# Patient Record
Sex: MALE | Race: WHITE | NOT HISPANIC OR LATINO | URBAN - METROPOLITAN AREA
[De-identification: names, ages, dates, MRNs, and addresses within clinical notes are randomized per-mention and may not be internally consistent; named-entity substitution may affect disease eponyms.]

---

## 2017-06-02 ENCOUNTER — HOSPITAL ENCOUNTER (OUTPATIENT)
Dept: CARDIOLOGY | Facility: HOSPITAL | Age: 57
Discharge: HOME | End: 2017-06-02
Attending: INTERNAL MEDICINE | Admitting: INTERNAL MEDICINE

## 2017-12-27 ENCOUNTER — HOSPITAL ENCOUNTER (OUTPATIENT)
Dept: PREADMISSION TESTING | Facility: HOSPITAL | Age: 57
Discharge: HOME | End: 2017-12-27
Attending: ANESTHESIOLOGY | Admitting: ANESTHESIOLOGY

## 2017-12-27 LAB
ANION GAP SERPL CALC-SCNC: 12.2 MMOL/L (ref 10–20)
BUN SERPL-MCNC: 12 MG/DL (ref 8–20)
BUN/CREAT SERPL: 15 (ref 6.2–20.3)
CALCIUM SERPL-MCNC: 9.3 MG/DL (ref 8.9–10.3)
CHLORIDE SERPL-SCNC: 96 MMOL/L (ref 101–111)
CONV CO2: 31 MMOL/L (ref 22–32)
CREAT UR-MCNC: 0.8 MG/DL (ref 0.7–1.2)
GLUCOSE SERPL-MCNC: 93 MG/DL (ref 65–99)
POTASSIUM SERPL-SCNC: 3.2 MMOL/L (ref 3.6–5.1)
SODIUM SERPL-SCNC: 136 MMOL/L (ref 136–144)

## 2017-12-29 ENCOUNTER — HOSPITAL ENCOUNTER (OUTPATIENT)
Dept: OTHER | Facility: HOSPITAL | Age: 57
Setting detail: SPECIMEN
Discharge: HOME | End: 2017-12-29
Attending: UROLOGY | Admitting: UROLOGY

## 2018-04-24 ENCOUNTER — HOSPITAL ENCOUNTER (OUTPATIENT)
Dept: LAB | Facility: HOSPITAL | Age: 58
Discharge: HOME | End: 2018-04-24
Attending: UROLOGY | Admitting: UROLOGY

## 2018-04-24 LAB
ANION GAP SERPL CALC-SCNC: 10.4 MMOL/L (ref 10–20)
BASOPHILS # BLD AUTO: 0 10*3/UL (ref 0–0.2)
BASOPHILS NFR BLD AUTO: 0 % (ref 0–2)
BUN SERPL-MCNC: 11 MG/DL (ref 8–20)
BUN/CREAT SERPL: 12.2 (ref 6.2–20.3)
CALCIUM SERPL-MCNC: 9.2 MG/DL (ref 8.9–10.3)
CHLORIDE SERPL-SCNC: 102 MMOL/L (ref 101–111)
CONV CO2: 29 MMOL/L (ref 22–32)
CREAT UR-MCNC: 0.9 MG/DL (ref 0.7–1.2)
DIFFERENTIAL METHOD BLD: (no result)
EOSINOPHIL # BLD AUTO: 0.1 10*3/UL (ref 0–0.3)
EOSINOPHIL # BLD AUTO: 2 % (ref 0–3)
ERYTHROCYTE [DISTWIDTH] IN BLOOD BY AUTOMATED COUNT: 12.8 % (ref 11.5–14.5)
GLUCOSE SERPL-MCNC: 102 MG/DL (ref 65–99)
HCT VFR BLD AUTO: 37.7 % (ref 40–54)
HGB BLD-MCNC: 12.8 G/DL (ref 14–18)
LYMPHOCYTES # BLD AUTO: 1.7 10*3/UL (ref 0.8–4.8)
LYMPHOCYTES NFR BLD AUTO: 26 % (ref 18–42)
MCH RBC QN AUTO: 30.2 PG (ref 26–32)
MCHC RBC AUTO-ENTMCNC: 33.9 G/DL (ref 32–36)
MCV RBC AUTO: 89.1 FL (ref 80–94)
MONOCYTES # BLD AUTO: 0.8 10*3/UL (ref 0.1–1.3)
MONOCYTES NFR BLD AUTO: 13 % (ref 2–11)
NEUTROPHILS # BLD AUTO: 3.8 10*3/UL (ref 2.3–8.6)
NEUTROPHILS NFR BLD AUTO: 59 % (ref 50–75)
NRBC BLD AUTO-RTO: 0 /100{WBCS}
NRBC/RBC NFR BLD MANUAL: 0 10*3/UL
PLATELET # BLD AUTO: 209 10*3/UL (ref 150–450)
PMV BLD AUTO: 8 FL (ref 7.4–10.4)
POTASSIUM SERPL-SCNC: 3.4 MMOL/L (ref 3.6–5.1)
RBC # BLD AUTO: 4.23 10*6/UL (ref 4.6–6)
SODIUM SERPL-SCNC: 138 MMOL/L (ref 136–144)
WBC # BLD AUTO: 6.5 10*3/UL (ref 4.5–11.5)

## 2018-05-01 ENCOUNTER — HOSPITAL ENCOUNTER (OUTPATIENT)
Dept: OTHER | Facility: HOSPITAL | Age: 58
Setting detail: SPECIMEN
Discharge: HOME | End: 2018-05-01
Attending: UROLOGY | Admitting: UROLOGY

## 2018-09-06 ENCOUNTER — HOSPITAL ENCOUNTER (OUTPATIENT)
Dept: PREADMISSION TESTING | Facility: HOSPITAL | Age: 58
Discharge: HOME | End: 2018-09-06
Attending: UROLOGY | Admitting: UROLOGY

## 2018-09-06 LAB
ANION GAP SERPL CALC-SCNC: 9.1 MMOL/L (ref 10–20)
BASOPHILS # BLD AUTO: 0 10*3/UL (ref 0–0.2)
BASOPHILS NFR BLD AUTO: 1 % (ref 0–2)
BUN SERPL-MCNC: 11 MG/DL (ref 8–20)
BUN/CREAT SERPL: 12.2 (ref 6.2–20.3)
CALCIUM SERPL-MCNC: 9 MG/DL (ref 8.9–10.3)
CHLORIDE SERPL-SCNC: 103 MMOL/L (ref 101–111)
CONV CO2: 30 MMOL/L (ref 22–32)
CREAT UR-MCNC: 0.9 MG/DL (ref 0.7–1.2)
DIFFERENTIAL METHOD BLD: (no result)
EOSINOPHIL # BLD AUTO: 0.1 10*3/UL (ref 0–0.3)
EOSINOPHIL # BLD AUTO: 2 % (ref 0–3)
ERYTHROCYTE [DISTWIDTH] IN BLOOD BY AUTOMATED COUNT: 12.7 % (ref 11.5–14.5)
GLUCOSE SERPL-MCNC: 108 MG/DL (ref 65–99)
HCT VFR BLD AUTO: 40.1 % (ref 40–54)
HGB BLD-MCNC: 13.3 G/DL (ref 14–18)
LYMPHOCYTES # BLD AUTO: 1.4 10*3/UL (ref 0.8–4.8)
LYMPHOCYTES NFR BLD AUTO: 26 % (ref 18–42)
MCH RBC QN AUTO: 29.9 PG (ref 26–32)
MCHC RBC AUTO-ENTMCNC: 33.3 G/DL (ref 32–36)
MCV RBC AUTO: 89.7 FL (ref 80–94)
MONOCYTES # BLD AUTO: 0.6 10*3/UL (ref 0.1–1.3)
MONOCYTES NFR BLD AUTO: 12 % (ref 2–11)
NEUTROPHILS # BLD AUTO: 3.2 10*3/UL (ref 2.3–8.6)
NEUTROPHILS NFR BLD AUTO: 59 % (ref 50–75)
NRBC BLD AUTO-RTO: 0 /100{WBCS}
NRBC/RBC NFR BLD MANUAL: 0 10*3/UL
PLATELET # BLD AUTO: 236 10*3/UL (ref 150–450)
PMV BLD AUTO: 8.1 FL (ref 7.4–10.4)
POTASSIUM SERPL-SCNC: 3.1 MMOL/L (ref 3.6–5.1)
RBC # BLD AUTO: 4.47 10*6/UL (ref 4.6–6)
SODIUM SERPL-SCNC: 139 MMOL/L (ref 136–144)
WBC # BLD AUTO: 5.4 10*3/UL (ref 4.5–11.5)

## 2018-09-11 ENCOUNTER — HOSPITAL ENCOUNTER (OUTPATIENT)
Dept: OTHER | Facility: HOSPITAL | Age: 58
Setting detail: SPECIMEN
Discharge: HOME | End: 2018-09-11
Attending: UROLOGY | Admitting: UROLOGY

## 2019-02-11 ENCOUNTER — ON CAMPUS - OUTPATIENT (AMBULATORY)
Dept: URBAN - METROPOLITAN AREA HOSPITAL 2 | Facility: HOSPITAL | Age: 59
End: 2019-02-11
Payer: COMMERCIAL

## 2019-02-11 VITALS
DIASTOLIC BLOOD PRESSURE: 76 MMHG | HEART RATE: 86 BPM | HEART RATE: 74 BPM | HEART RATE: 65 BPM | SYSTOLIC BLOOD PRESSURE: 130 MMHG | DIASTOLIC BLOOD PRESSURE: 64 MMHG | SYSTOLIC BLOOD PRESSURE: 109 MMHG | SYSTOLIC BLOOD PRESSURE: 148 MMHG | SYSTOLIC BLOOD PRESSURE: 119 MMHG | RESPIRATION RATE: 16 BRPM | HEIGHT: 73 IN | SYSTOLIC BLOOD PRESSURE: 120 MMHG | SYSTOLIC BLOOD PRESSURE: 124 MMHG | SYSTOLIC BLOOD PRESSURE: 105 MMHG | OXYGEN SATURATION: 98 % | HEART RATE: 75 BPM | DIASTOLIC BLOOD PRESSURE: 69 MMHG | OXYGEN SATURATION: 99 % | DIASTOLIC BLOOD PRESSURE: 74 MMHG | OXYGEN SATURATION: 97 % | OXYGEN SATURATION: 95 % | OXYGEN SATURATION: 100 % | HEART RATE: 70 BPM | DIASTOLIC BLOOD PRESSURE: 89 MMHG | DIASTOLIC BLOOD PRESSURE: 66 MMHG | WEIGHT: 195 LBS | TEMPERATURE: 98.2 F | DIASTOLIC BLOOD PRESSURE: 80 MMHG | RESPIRATION RATE: 18 BRPM | SYSTOLIC BLOOD PRESSURE: 125 MMHG | HEART RATE: 71 BPM | SYSTOLIC BLOOD PRESSURE: 140 MMHG | HEART RATE: 76 BPM | HEART RATE: 80 BPM

## 2019-02-11 DIAGNOSIS — K57.30 DIVERTICULOSIS OF LARGE INTESTINE WITHOUT PERFORATION OR ABS: ICD-10-CM

## 2019-02-11 DIAGNOSIS — Z80.0 FAMILY HISTORY OF MALIGNANT NEOPLASM OF DIGESTIVE ORGANS: ICD-10-CM

## 2019-02-11 PROCEDURE — 45378 DIAGNOSTIC COLONOSCOPY: CPT | Mod: 33 | Performed by: INTERNAL MEDICINE

## 2019-07-01 RX ORDER — LISINOPRIL 20 MG/1
TABLET ORAL
Qty: 180 TABLET | Refills: 1 | Status: SHIPPED | OUTPATIENT
Start: 2019-07-01 | End: 2020-01-31

## 2020-01-31 RX ORDER — LISINOPRIL 20 MG/1
TABLET ORAL
Qty: 180 TABLET | Refills: 0 | Status: SHIPPED | OUTPATIENT
Start: 2020-01-31 | End: 2020-05-11

## 2020-05-11 RX ORDER — LISINOPRIL 20 MG/1
TABLET ORAL
Qty: 180 TABLET | Refills: 0 | Status: SHIPPED | OUTPATIENT
Start: 2020-05-11 | End: 2020-08-17

## 2020-08-17 RX ORDER — LISINOPRIL 20 MG/1
TABLET ORAL
Qty: 180 TABLET | Refills: 0 | Status: SHIPPED | OUTPATIENT
Start: 2020-08-17 | End: 2020-11-19

## 2020-11-14 DIAGNOSIS — I10 ESSENTIAL HYPERTENSION: Primary | ICD-10-CM

## 2020-11-19 RX ORDER — LISINOPRIL 20 MG/1
TABLET ORAL
Qty: 180 TABLET | Refills: 0 | Status: SHIPPED | OUTPATIENT
Start: 2020-11-19 | End: 2021-02-15

## 2021-02-14 DIAGNOSIS — I10 ESSENTIAL HYPERTENSION: ICD-10-CM

## 2021-02-15 PROBLEM — R07.9 CHEST PAIN: Status: ACTIVE | Noted: 2017-05-17

## 2021-02-15 PROBLEM — F10.10 ALCOHOL ABUSE: Status: ACTIVE | Noted: 2017-05-17

## 2021-02-15 PROBLEM — I10 HYPERTENSION: Status: ACTIVE | Noted: 2017-05-17

## 2021-02-15 PROBLEM — R06.02 SHORTNESS OF BREATH: Status: ACTIVE | Noted: 2017-05-18

## 2021-02-15 RX ORDER — HYDROCHLOROTHIAZIDE 25 MG/1
TABLET ORAL
COMMUNITY
Start: 2017-05-17

## 2021-02-15 RX ORDER — NEBIVOLOL 20 MG/1
TABLET ORAL
COMMUNITY
Start: 2017-05-17

## 2021-02-15 RX ORDER — LISINOPRIL 20 MG/1
TABLET ORAL EVERY 12 HOURS
COMMUNITY
Start: 2018-01-29 | End: 2021-02-15

## 2021-02-15 RX ORDER — LISINOPRIL 20 MG/1
TABLET ORAL
Qty: 180 TABLET | Refills: 0 | Status: SHIPPED | OUTPATIENT
Start: 2021-02-15

## 2021-02-15 RX ORDER — HYDRALAZINE HYDROCHLORIDE 50 MG/1
TABLET, FILM COATED ORAL
COMMUNITY
Start: 2017-05-17

## 2021-02-15 RX ORDER — TAMSULOSIN HYDROCHLORIDE 0.4 MG/1
CAPSULE ORAL
COMMUNITY
Start: 2017-05-17

## 2021-05-14 DIAGNOSIS — I10 ESSENTIAL HYPERTENSION: ICD-10-CM

## 2021-05-14 RX ORDER — AMLODIPINE BESYLATE 5 MG/1
TABLET ORAL
COMMUNITY
Start: 2021-05-14

## 2021-05-14 RX ORDER — LISINOPRIL 20 MG/1
TABLET ORAL
Qty: 180 TABLET | Refills: 0 | OUTPATIENT
Start: 2021-05-14

## 2023-04-19 ENCOUNTER — HOSPITAL ENCOUNTER (OUTPATIENT)
Facility: HOSPITAL | Age: 63
Setting detail: OBSERVATION
Discharge: HOME | End: 2023-04-20
Attending: EMERGENCY MEDICINE | Admitting: INTERNAL MEDICINE
Payer: COMMERCIAL

## 2023-04-19 ENCOUNTER — APPOINTMENT (OUTPATIENT)
Dept: GENERAL RADIOLOGY | Facility: HOSPITAL | Age: 63
End: 2023-04-19
Payer: COMMERCIAL

## 2023-04-19 DIAGNOSIS — R07.9 CHEST PAIN, UNSPECIFIED TYPE: Primary | ICD-10-CM

## 2023-04-19 DIAGNOSIS — I15.0 RENOVASCULAR HYPERTENSION: ICD-10-CM

## 2023-04-19 PROBLEM — R51.9 HEADACHE: Status: ACTIVE | Noted: 2023-04-19

## 2023-04-19 PROBLEM — E87.6 HYPOKALEMIA: Status: ACTIVE | Noted: 2023-04-19

## 2023-04-19 LAB
ALBUMIN SERPL-MCNC: 4.4 G/DL (ref 3.5–5.2)
ALBUMIN/GLOB SERPL: 1.6 G/DL
ALP SERPL-CCNC: 50 U/L (ref 39–117)
ALT SERPL W P-5'-P-CCNC: 15 U/L (ref 1–41)
ANION GAP SERPL CALCULATED.3IONS-SCNC: 12 MMOL/L (ref 5–15)
AST SERPL-CCNC: 17 U/L (ref 1–40)
BASOPHILS # BLD AUTO: 0 10*3/MM3 (ref 0–0.2)
BASOPHILS NFR BLD AUTO: 0.4 % (ref 0–1.5)
BILIRUB SERPL-MCNC: 0.6 MG/DL (ref 0–1.2)
BUN SERPL-MCNC: 15 MG/DL (ref 8–23)
BUN/CREAT SERPL: 18.1 (ref 7–25)
CALCIUM SPEC-SCNC: 9.8 MG/DL (ref 8.6–10.5)
CHLORIDE SERPL-SCNC: 100 MMOL/L (ref 98–107)
CHOLEST SERPL-MCNC: 199 MG/DL (ref 0–200)
CO2 SERPL-SCNC: 29 MMOL/L (ref 22–29)
CREAT SERPL-MCNC: 0.83 MG/DL (ref 0.76–1.27)
DEPRECATED RDW RBC AUTO: 41.1 FL (ref 37–54)
EGFRCR SERPLBLD CKD-EPI 2021: 98.3 ML/MIN/1.73
EOSINOPHIL # BLD AUTO: 0.2 10*3/MM3 (ref 0–0.4)
EOSINOPHIL NFR BLD AUTO: 2.3 % (ref 0.3–6.2)
ERYTHROCYTE [DISTWIDTH] IN BLOOD BY AUTOMATED COUNT: 13.4 % (ref 12.3–15.4)
GEN 5 2HR TROPONIN T REFLEX: 9 NG/L
GLOBULIN UR ELPH-MCNC: 2.8 GM/DL
GLUCOSE SERPL-MCNC: 107 MG/DL (ref 65–99)
HCT VFR BLD AUTO: 39.6 % (ref 37.5–51)
HDLC SERPL-MCNC: 70 MG/DL (ref 40–60)
HGB BLD-MCNC: 13.3 G/DL (ref 13–17.7)
LDLC SERPL CALC-MCNC: 113 MG/DL (ref 0–100)
LDLC/HDLC SERPL: 1.59 {RATIO}
LYMPHOCYTES # BLD AUTO: 2 10*3/MM3 (ref 0.7–3.1)
LYMPHOCYTES NFR BLD AUTO: 26.6 % (ref 19.6–45.3)
MAGNESIUM SERPL-MCNC: 2 MG/DL (ref 1.6–2.4)
MCH RBC QN AUTO: 30 PG (ref 26.6–33)
MCHC RBC AUTO-ENTMCNC: 33.5 G/DL (ref 31.5–35.7)
MCV RBC AUTO: 89.5 FL (ref 79–97)
MONOCYTES # BLD AUTO: 0.7 10*3/MM3 (ref 0.1–0.9)
MONOCYTES NFR BLD AUTO: 9.9 % (ref 5–12)
NEUTROPHILS NFR BLD AUTO: 4.6 10*3/MM3 (ref 1.7–7)
NEUTROPHILS NFR BLD AUTO: 60.8 % (ref 42.7–76)
NRBC BLD AUTO-RTO: 0 /100 WBC (ref 0–0.2)
PLATELET # BLD AUTO: 224 10*3/MM3 (ref 140–450)
PMV BLD AUTO: 7.5 FL (ref 6–12)
POTASSIUM SERPL-SCNC: 2.9 MMOL/L (ref 3.5–5.2)
PROT SERPL-MCNC: 7.2 G/DL (ref 6–8.5)
RBC # BLD AUTO: 4.43 10*6/MM3 (ref 4.14–5.8)
SODIUM SERPL-SCNC: 141 MMOL/L (ref 136–145)
T4 FREE SERPL-MCNC: 1.33 NG/DL (ref 0.93–1.7)
TRIGL SERPL-MCNC: 88 MG/DL (ref 0–150)
TROPONIN T DELTA: 1 NG/L
TROPONIN T SERPL HS-MCNC: 8 NG/L
TSH SERPL DL<=0.05 MIU/L-ACNC: 2.59 UIU/ML (ref 0.27–4.2)
VLDLC SERPL-MCNC: 16 MG/DL (ref 5–40)
WBC NRBC COR # BLD: 7.5 10*3/MM3 (ref 3.4–10.8)

## 2023-04-19 PROCEDURE — 84484 ASSAY OF TROPONIN QUANT: CPT | Performed by: PHYSICIAN ASSISTANT

## 2023-04-19 PROCEDURE — G0378 HOSPITAL OBSERVATION PER HR: HCPCS

## 2023-04-19 PROCEDURE — 80053 COMPREHEN METABOLIC PANEL: CPT | Performed by: PHYSICIAN ASSISTANT

## 2023-04-19 PROCEDURE — 93005 ELECTROCARDIOGRAM TRACING: CPT | Performed by: PHYSICIAN ASSISTANT

## 2023-04-19 PROCEDURE — 80061 LIPID PANEL: CPT | Performed by: INTERNAL MEDICINE

## 2023-04-19 PROCEDURE — 84443 ASSAY THYROID STIM HORMONE: CPT | Performed by: INTERNAL MEDICINE

## 2023-04-19 PROCEDURE — 71045 X-RAY EXAM CHEST 1 VIEW: CPT

## 2023-04-19 PROCEDURE — 36415 COLL VENOUS BLD VENIPUNCTURE: CPT

## 2023-04-19 PROCEDURE — 99285 EMERGENCY DEPT VISIT HI MDM: CPT

## 2023-04-19 PROCEDURE — 83735 ASSAY OF MAGNESIUM: CPT | Performed by: EMERGENCY MEDICINE

## 2023-04-19 PROCEDURE — 84484 ASSAY OF TROPONIN QUANT: CPT | Performed by: INTERNAL MEDICINE

## 2023-04-19 PROCEDURE — 85025 COMPLETE CBC W/AUTO DIFF WBC: CPT | Performed by: PHYSICIAN ASSISTANT

## 2023-04-19 PROCEDURE — 84439 ASSAY OF FREE THYROXINE: CPT | Performed by: INTERNAL MEDICINE

## 2023-04-19 RX ORDER — TAMSULOSIN HYDROCHLORIDE 0.4 MG/1
0.4 CAPSULE ORAL NIGHTLY
Status: DISCONTINUED | OUTPATIENT
Start: 2023-04-19 | End: 2023-04-20 | Stop reason: HOSPADM

## 2023-04-19 RX ORDER — NITROGLYCERIN 0.4 MG/1
0.4 TABLET SUBLINGUAL
Status: DISCONTINUED | OUTPATIENT
Start: 2023-04-19 | End: 2023-04-20 | Stop reason: HOSPADM

## 2023-04-19 RX ORDER — HYDRALAZINE HYDROCHLORIDE 25 MG/1
50 TABLET, FILM COATED ORAL EVERY 12 HOURS SCHEDULED
Status: DISCONTINUED | OUTPATIENT
Start: 2023-04-19 | End: 2023-04-20 | Stop reason: HOSPADM

## 2023-04-19 RX ORDER — SODIUM CHLORIDE 0.9 % (FLUSH) 0.9 %
3-10 SYRINGE (ML) INJECTION AS NEEDED
Status: DISCONTINUED | OUTPATIENT
Start: 2023-04-19 | End: 2023-04-20 | Stop reason: HOSPADM

## 2023-04-19 RX ORDER — SODIUM CHLORIDE 9 MG/ML
40 INJECTION, SOLUTION INTRAVENOUS AS NEEDED
Status: DISCONTINUED | OUTPATIENT
Start: 2023-04-19 | End: 2023-04-20 | Stop reason: HOSPADM

## 2023-04-19 RX ORDER — AMLODIPINE BESYLATE 5 MG/1
5 TABLET ORAL
Status: DISCONTINUED | OUTPATIENT
Start: 2023-04-20 | End: 2023-04-20 | Stop reason: HOSPADM

## 2023-04-19 RX ORDER — ASPIRIN 325 MG
325 TABLET ORAL ONCE
Status: COMPLETED | OUTPATIENT
Start: 2023-04-19 | End: 2023-04-19

## 2023-04-19 RX ORDER — LISINOPRIL 20 MG/1
20 TABLET ORAL 2 TIMES DAILY
Status: DISCONTINUED | OUTPATIENT
Start: 2023-04-19 | End: 2023-04-20 | Stop reason: HOSPADM

## 2023-04-19 RX ORDER — ONDANSETRON 2 MG/ML
4 INJECTION INTRAMUSCULAR; INTRAVENOUS EVERY 6 HOURS PRN
Status: DISCONTINUED | OUTPATIENT
Start: 2023-04-19 | End: 2023-04-20 | Stop reason: HOSPADM

## 2023-04-19 RX ORDER — SODIUM CHLORIDE 0.9 % (FLUSH) 0.9 %
3 SYRINGE (ML) INJECTION EVERY 12 HOURS SCHEDULED
Status: DISCONTINUED | OUTPATIENT
Start: 2023-04-19 | End: 2023-04-20 | Stop reason: HOSPADM

## 2023-04-19 RX ORDER — ASPIRIN 81 MG/1
324 TABLET, CHEWABLE ORAL ONCE
Status: DISCONTINUED | OUTPATIENT
Start: 2023-04-19 | End: 2023-04-19

## 2023-04-19 RX ORDER — POTASSIUM CHLORIDE 20 MEQ/1
40 TABLET, EXTENDED RELEASE ORAL AS NEEDED
Status: DISCONTINUED | OUTPATIENT
Start: 2023-04-19 | End: 2023-04-20 | Stop reason: HOSPADM

## 2023-04-19 RX ORDER — ACETAMINOPHEN 325 MG/1
650 TABLET ORAL EVERY 4 HOURS PRN
Status: DISCONTINUED | OUTPATIENT
Start: 2023-04-19 | End: 2023-04-20 | Stop reason: HOSPADM

## 2023-04-19 RX ORDER — SODIUM CHLORIDE 0.9 % (FLUSH) 0.9 %
10 SYRINGE (ML) INJECTION AS NEEDED
Status: DISCONTINUED | OUTPATIENT
Start: 2023-04-19 | End: 2023-04-20 | Stop reason: HOSPADM

## 2023-04-19 RX ORDER — POTASSIUM CHLORIDE 1.5 G/1.77G
40 POWDER, FOR SOLUTION ORAL AS NEEDED
Status: DISCONTINUED | OUTPATIENT
Start: 2023-04-19 | End: 2023-04-20 | Stop reason: HOSPADM

## 2023-04-19 RX ORDER — FAMOTIDINE 20 MG/1
40 TABLET, FILM COATED ORAL NIGHTLY
Status: DISCONTINUED | OUTPATIENT
Start: 2023-04-19 | End: 2023-04-20 | Stop reason: HOSPADM

## 2023-04-19 RX ORDER — POTASSIUM CHLORIDE 7.45 MG/ML
10 INJECTION INTRAVENOUS
Status: DISCONTINUED | OUTPATIENT
Start: 2023-04-19 | End: 2023-04-20 | Stop reason: HOSPADM

## 2023-04-19 RX ADMIN — HYDRALAZINE HYDROCHLORIDE 50 MG: 25 TABLET, FILM COATED ORAL at 21:35

## 2023-04-19 RX ADMIN — FAMOTIDINE 40 MG: 20 TABLET, FILM COATED ORAL at 21:35

## 2023-04-19 RX ADMIN — ASPIRIN 325 MG ORAL TABLET 325 MG: 325 PILL ORAL at 20:33

## 2023-04-19 RX ADMIN — LISINOPRIL 20 MG: 20 TABLET ORAL at 21:35

## 2023-04-19 RX ADMIN — Medication 3 ML: at 21:37

## 2023-04-19 NOTE — ED PROVIDER NOTES
Subjective    Provider in Triage Note  Patient is a 62-year-old male presents to the ED with complaints of hypertension for the past 2 days.  Patient states his blood pressure is running in the 170s systolically.  Patient states he is on multiple medications for his blood pressure without any changes.  He does report an associated headache reports gradual in onset denies thunderclap or worsening of his life.  No visual disturbances.  He also reports intermittent substernal pressure type chest pain over the past few days none currently.  No reported shortness of breath.  No recent travel surgeries or immobilization.    PCP Dr. Garrido      History of Present Illness  Chief complaint tightness in chest elevated blood pressure    History of present illness a 62-year-old male states that he has had some intermittent tightness in his chest throughout the day.  No neck arm jaw pain has been mild.  No really pain.  He states he has been nauseous throughout the day and has bothered more than anything no vomiting no black or bloody stool.  He denies any fever chills sweats cough congestion.  Blood pressures been elevated in the 170s to 180s.  Nothing really seem to trigger it nothing makes it better nothing makes it worse spent pretty much most of the day at work.  Patient has severe blood pressure he states that he takes about 4 different medications but no change most recently.  No recent long car ride plane ride immobilization foreign travels no leg pain or swelling.  Denies any abdominal pain.  He did also complain of a little bit of a mild headache to the back of his head which is count of been throughout the day is gradual its not a thunderclap there is no associated visual changes vomiting or speech difficulty no paralysis.  Has been able to eat drink talk walk and function normally otherwise        Review of Systems   Constitutional: Negative for chills and fever.   Respiratory: Positive for chest tightness. Negative  for shortness of breath.    Cardiovascular: Negative for chest pain and palpitations.   Gastrointestinal: Positive for nausea. Negative for abdominal pain.   Endocrine: Negative for cold intolerance and heat intolerance.   Genitourinary: Negative for difficulty urinating and dysuria.   Musculoskeletal: Negative for back pain and neck pain.   Skin: Negative for rash and wound.   Neurological: Negative for dizziness and light-headedness.   Psychiatric/Behavioral: Negative for agitation and confusion.       No past medical history on file.  Hypertension  No Known Allergies    No past surgical history on file.    No family history on file.    Social History     Socioeconomic History   • Marital status:      Social history patient has no history tobacco alcohol or drug  Prior to Admission medications    Medication Sig Start Date End Date Taking? Authorizing Provider   amLODIPine (NORVASC) 5 MG tablet  5/14/21   Maciel Isaac MD   hydrALAZINE (APRESOLINE) 50 MG tablet HYDRALAZINE HCL 50 MG TABS 5/17/17   Maciel Isaac MD   hydroCHLOROthiazide (HYDRODIURIL) 25 MG tablet HYDROCHLOROTHIAZIDE 25 MG TABS 5/17/17   Maciel Isaac MD   lisinopril (PRINIVIL,ZESTRIL) 20 MG tablet TAKE 1 TABLET BY MOUTH TWO TIMES A DAY  2/15/21   Gary Miller MD   nebivolol (Bystolic) 20 MG tablet BYSTOLIC 20 MG TABS 5/17/17   Maciel Isaac MD   tamsulosin (Flomax) 0.4 MG capsule 24 hr capsule FLOMAX 0.4 MG CAPS 5/17/17   Maciel Isaac MD         Objective   Physical Exam  Constitutional is a 62-year-old male awake alert no acute distress triage vital signs of been reviewed.  Blood pressure 168/98 temperature is 97.9 heart rate 83 respirations 16 sats 98% on room air HEENT extraocular muscles are intact pupils equal round reactive sclera clear there is no photophobia.  No papilledema.  Neck supple no adenopathy no JV no bruits.  Lungs clear no retraction no use of accessories.  Heart regular without  murmur.  Abdomen soft nontender good bowel sounds no peritoneal findings or pulsatile masses extremities pulses equal upper lower extremities no edema cords or Homans' sign or evidence of DVT.  Skin warm and dry without rashes or cellulitic changes neurologic awake alert orientated x4 no facial asymmetry normal speech no drift the arms legs without focal weakness.  Procedures           ED Course    EKG my interpretation normal sinus rhythm rate of 72 some LVH QTc of 509 no acute ST elevation nonspecific interventricular conduction delay really no significant change from when compared to 12/27/2017 abnormal EKG        Results for orders placed or performed during the hospital encounter of 04/19/23   Comprehensive Metabolic Panel    Specimen: Blood   Result Value Ref Range    Glucose 107 (H) 65 - 99 mg/dL    BUN 15 8 - 23 mg/dL    Creatinine 0.83 0.76 - 1.27 mg/dL    Sodium 141 136 - 145 mmol/L    Potassium 2.9 (L) 3.5 - 5.2 mmol/L    Chloride 100 98 - 107 mmol/L    CO2 29.0 22.0 - 29.0 mmol/L    Calcium 9.8 8.6 - 10.5 mg/dL    Total Protein 7.2 6.0 - 8.5 g/dL    Albumin 4.4 3.5 - 5.2 g/dL    ALT (SGPT) 15 1 - 41 U/L    AST (SGOT) 17 1 - 40 U/L    Alkaline Phosphatase 50 39 - 117 U/L    Total Bilirubin 0.6 0.0 - 1.2 mg/dL    Globulin 2.8 gm/dL    A/G Ratio 1.6 g/dL    BUN/Creatinine Ratio 18.1 7.0 - 25.0    Anion Gap 12.0 5.0 - 15.0 mmol/L    eGFR 98.3 >60.0 mL/min/1.73   High Sensitivity Troponin T    Specimen: Blood   Result Value Ref Range    HS Troponin T 8 <15 ng/L   CBC Auto Differential    Specimen: Blood   Result Value Ref Range    WBC 7.50 3.40 - 10.80 10*3/mm3    RBC 4.43 4.14 - 5.80 10*6/mm3    Hemoglobin 13.3 13.0 - 17.7 g/dL    Hematocrit 39.6 37.5 - 51.0 %    MCV 89.5 79.0 - 97.0 fL    MCH 30.0 26.6 - 33.0 pg    MCHC 33.5 31.5 - 35.7 g/dL    RDW 13.4 12.3 - 15.4 %    RDW-SD 41.1 37.0 - 54.0 fl    MPV 7.5 6.0 - 12.0 fL    Platelets 224 140 - 450 10*3/mm3    Neutrophil % 60.8 42.7 - 76.0 %    Lymphocyte %  26.6 19.6 - 45.3 %    Monocyte % 9.9 5.0 - 12.0 %    Eosinophil % 2.3 0.3 - 6.2 %    Basophil % 0.4 0.0 - 1.5 %    Neutrophils, Absolute 4.60 1.70 - 7.00 10*3/mm3    Lymphocytes, Absolute 2.00 0.70 - 3.10 10*3/mm3    Monocytes, Absolute 0.70 0.10 - 0.90 10*3/mm3    Eosinophils, Absolute 0.20 0.00 - 0.40 10*3/mm3    Basophils, Absolute 0.00 0.00 - 0.20 10*3/mm3    nRBC 0.0 0.0 - 0.2 /100 WBC   High Sensitivity Troponin T 2Hr    Specimen: Blood   Result Value Ref Range    HS Troponin T 9 <15 ng/L    Troponin T Delta 1 >=-4 - <+4 ng/L   Magnesium    Specimen: Blood   Result Value Ref Range    Magnesium 2.0 1.6 - 2.4 mg/dL   Lipid Panel    Specimen: Blood   Result Value Ref Range    Total Cholesterol 199 0 - 200 mg/dL    Triglycerides 88 0 - 150 mg/dL    HDL Cholesterol 70 (H) 40 - 60 mg/dL    LDL Cholesterol  113 (H) 0 - 100 mg/dL    VLDL Cholesterol 16 5 - 40 mg/dL    LDL/HDL Ratio 1.59    TSH    Specimen: Blood   Result Value Ref Range    TSH 2.590 0.270 - 4.200 uIU/mL   T4, Free    Specimen: Blood   Result Value Ref Range    Free T4 1.33 0.93 - 1.70 ng/dL   ECG 12 Lead Chest Pain   Result Value Ref Range    QT Interval 465 ms     XR Chest 1 View    Result Date: 4/19/2023  Impression: 1.An acute pulmonary process is not apparent. Electronically Signed: Chucho James MD  4/19/2023 9:01 PM EDT  Workstation ID: [id number]    Medications   sodium chloride 0.9 % flush 10 mL (has no administration in time range)   amLODIPine (NORVASC) tablet 5 mg (has no administration in time range)   hydrALAZINE (APRESOLINE) tablet 50 mg (50 mg Oral Given 4/19/23 2135)   lisinopril (PRINIVIL,ZESTRIL) tablet 20 mg (20 mg Oral Given 4/19/23 2135)   tamsulosin (FLOMAX) 24 hr capsule 0.4 mg (0.4 mg Oral Not Given 4/19/23 2135)   sodium chloride 0.9 % flush 3 mL (3 mL Intravenous Given 4/19/23 2137)   sodium chloride 0.9 % flush 3-10 mL (has no administration in time range)   sodium chloride 0.9 % infusion 40 mL (has no administration in time  range)   nitroglycerin (NITROSTAT) SL tablet 0.4 mg (has no administration in time range)   acetaminophen (TYLENOL) tablet 650 mg (has no administration in time range)   ondansetron (ZOFRAN) injection 4 mg (has no administration in time range)   famotidine (PEPCID) tablet 40 mg (40 mg Oral Given 4/19/23 2135)   potassium chloride (K-DUR,KLOR-CON) CR tablet 40 mEq (has no administration in time range)     Or   potassium chloride (KLOR-CON) packet 40 mEq (has no administration in time range)     Or   potassium chloride 10 mEq in 100 mL IVPB (has no administration in time range)   aspirin tablet 325 mg (325 mg Oral Given 4/19/23 2033)                                       Medical Decision Making  Medical decision making IV established monitor placed my review of sinus rhythm.  EKG obtained reviewed by me independently shows a sinus rhythm with some LVH and nonspecific interventricular duction delay but there is no change from comparison 12/27/2017.  Abnormal EKG.  Labs obtained independent reviewed by me.  Chemistries unremarkable and a potassium of 2.9 troponin was negative CBC unremarkable repeat troponin was negative.  Chest x-ray obtained independent reviewed by me no pneumonia pneumothorax or acute findings radiology unremarkable.  Patient repeat exam is resting comfortably.  Was given an aspirin p.o.  Did not feel the need to do a CAT scan of the patient's head he has a mild headache his blood pressure was elevated he has no findings here he states he feels otherwise fine there is no evidence of acute stroke or acute hemorrhage on my exam.  So did not feel that was necessary at this point.  I do not see evidence of acute myocardial infarction DVT pulmonary embolism or aortic dissection based on the history physical clinical findings.  This is not a complete list of all possibilities that can cause chest pains and headaches elevated blood pressure.  Patient had been given aspirin p.o. I talked to Dr. Fierro the  patient's primary care providers and discussed the case and he will be admitted for further work-up stable unremarkable ER course.    Chest pain, unspecified type: acute illness or injury  Amount and/or Complexity of Data Reviewed  Labs: ordered. Decision-making details documented in ED Course.  Radiology: ordered and independent interpretation performed.  ECG/medicine tests: ordered and independent interpretation performed.      Risk  Decision regarding hospitalization.          Final diagnoses:   Chest pain, unspecified type       ED Disposition  ED Disposition     ED Disposition   Decision to Admit    Condition   --    Comment   Level of Care: Telemetry [5]   Admitting Physician: GERALDINE GREY [[id number]]   Attending Physician: GERALDINE GREY [[id number]]               No follow-up provider specified.       Medication List      No changes were made to your prescriptions during this visit.          Syed Armas MD  04/19/23 0593

## 2023-04-20 ENCOUNTER — APPOINTMENT (OUTPATIENT)
Dept: NUCLEAR MEDICINE | Facility: HOSPITAL | Age: 63
End: 2023-04-20
Payer: COMMERCIAL

## 2023-04-20 ENCOUNTER — APPOINTMENT (OUTPATIENT)
Dept: CARDIOLOGY | Facility: HOSPITAL | Age: 63
End: 2023-04-20
Payer: COMMERCIAL

## 2023-04-20 VITALS
SYSTOLIC BLOOD PRESSURE: 148 MMHG | TEMPERATURE: 97.9 F | RESPIRATION RATE: 16 BRPM | BODY MASS INDEX: 25.62 KG/M2 | HEART RATE: 72 BPM | OXYGEN SATURATION: 95 % | DIASTOLIC BLOOD PRESSURE: 83 MMHG | HEIGHT: 71 IN | WEIGHT: 182.98 LBS

## 2023-04-20 LAB
BH CV NUCLEAR PRIOR STUDY: 2
BH CV REST NUCLEAR ISOTOPE DOSE: 11 MCI
BH CV STRESS BP STAGE 1: NORMAL
BH CV STRESS BP STAGE 2: NORMAL
BH CV STRESS BP STAGE 3: NORMAL
BH CV STRESS BP STAGE 4: NORMAL
BH CV STRESS BP STAGE 5: NORMAL
BH CV STRESS DURATION MIN STAGE 1: 3
BH CV STRESS DURATION MIN STAGE 2: 3
BH CV STRESS DURATION MIN STAGE 3: 3
BH CV STRESS DURATION MIN STAGE 4: 1
BH CV STRESS DURATION MIN STAGE 5: 1
BH CV STRESS DURATION SEC STAGE 1: 0
BH CV STRESS DURATION SEC STAGE 2: 0
BH CV STRESS DURATION SEC STAGE 3: 0
BH CV STRESS DURATION SEC STAGE 4: 33
BH CV STRESS DURATION SEC STAGE 5: 11
BH CV STRESS GRADE STAGE 1: 10
BH CV STRESS GRADE STAGE 2: 12
BH CV STRESS GRADE STAGE 3: 14
BH CV STRESS GRADE STAGE 4: 16
BH CV STRESS GRADE STAGE 5: 18
BH CV STRESS HR STAGE 1: 99
BH CV STRESS HR STAGE 2: 109
BH CV STRESS HR STAGE 3: 132
BH CV STRESS HR STAGE 4: 146
BH CV STRESS HR STAGE 5: 162
BH CV STRESS METS STAGE 1: 5
BH CV STRESS METS STAGE 2: 7.5
BH CV STRESS METS STAGE 3: 10
BH CV STRESS METS STAGE 4: 13.5
BH CV STRESS METS STAGE 5: 15
BH CV STRESS NUCLEAR ISOTOPE DOSE: 31.1 MCI
BH CV STRESS PROTOCOL 1: NORMAL
BH CV STRESS RECOVERY BP: NORMAL MMHG
BH CV STRESS RECOVERY HR: 102 BPM
BH CV STRESS SPEED STAGE 1: 1.7
BH CV STRESS SPEED STAGE 2: 2.5
BH CV STRESS SPEED STAGE 3: 3.4
BH CV STRESS SPEED STAGE 4: 4.2
BH CV STRESS SPEED STAGE 5: 5
BH CV STRESS STAGE 1: 1
BH CV STRESS STAGE 2: 2
BH CV STRESS STAGE 3: 3
BH CV STRESS STAGE 4: 4
BH CV STRESS STAGE 5: 5
LV EF NUC BP: 62 %
MAXIMAL PREDICTED HEART RATE: 157 BPM
PERCENT MAX PREDICTED HR: 103.82 %
POTASSIUM SERPL-SCNC: 3.5 MMOL/L (ref 3.5–5.2)
QT INTERVAL: 465 MS
STRESS BASELINE BP: NORMAL MMHG
STRESS BASELINE HR: 84 BPM
STRESS PERCENT HR: 122 %
STRESS POST ESTIMATED WORKLOAD: 14.8 METS
STRESS POST EXERCISE DUR MIN: 11 MIN
STRESS POST EXERCISE DUR SEC: 44 SEC
STRESS POST PEAK BP: NORMAL MMHG
STRESS POST PEAK HR: 163 BPM
STRESS TARGET HR: 133 BPM

## 2023-04-20 PROCEDURE — 78452 HT MUSCLE IMAGE SPECT MULT: CPT | Performed by: INTERNAL MEDICINE

## 2023-04-20 PROCEDURE — 78452 HT MUSCLE IMAGE SPECT MULT: CPT

## 2023-04-20 PROCEDURE — 93017 CV STRESS TEST TRACING ONLY: CPT

## 2023-04-20 PROCEDURE — G0378 HOSPITAL OBSERVATION PER HR: HCPCS

## 2023-04-20 PROCEDURE — 84132 ASSAY OF SERUM POTASSIUM: CPT | Performed by: INTERNAL MEDICINE

## 2023-04-20 PROCEDURE — A9502 TC99M TETROFOSMIN: HCPCS | Performed by: INTERNAL MEDICINE

## 2023-04-20 PROCEDURE — 93018 CV STRESS TEST I&R ONLY: CPT | Performed by: INTERNAL MEDICINE

## 2023-04-20 PROCEDURE — 0 TECHNETIUM TETROFOSMIN KIT: Performed by: INTERNAL MEDICINE

## 2023-04-20 RX ORDER — ACETAMINOPHEN 325 MG/1
650 TABLET ORAL EVERY 4 HOURS PRN
Start: 2023-04-20

## 2023-04-20 RX ORDER — FAMOTIDINE 40 MG/1
40 TABLET, FILM COATED ORAL NIGHTLY
Qty: 30 TABLET | Refills: 1 | Status: SHIPPED | OUTPATIENT
Start: 2023-04-20

## 2023-04-20 RX ADMIN — TETROFOSMIN 1 DOSE: 1.38 INJECTION, POWDER, LYOPHILIZED, FOR SOLUTION INTRAVENOUS at 10:37

## 2023-04-20 RX ADMIN — POTASSIUM CHLORIDE 40 MEQ: 1500 TABLET, EXTENDED RELEASE ORAL at 12:27

## 2023-04-20 RX ADMIN — AMLODIPINE BESYLATE 5 MG: 5 TABLET ORAL at 08:12

## 2023-04-20 RX ADMIN — HYDRALAZINE HYDROCHLORIDE 50 MG: 25 TABLET, FILM COATED ORAL at 08:12

## 2023-04-20 RX ADMIN — POTASSIUM CHLORIDE 40 MEQ: 1500 TABLET, EXTENDED RELEASE ORAL at 06:52

## 2023-04-20 RX ADMIN — LISINOPRIL 20 MG: 20 TABLET ORAL at 08:12

## 2023-04-20 RX ADMIN — TETROFOSMIN 1 DOSE: 1.38 INJECTION, POWDER, LYOPHILIZED, FOR SOLUTION INTRAVENOUS at 12:04

## 2023-04-20 RX ADMIN — Medication 3 ML: at 08:13

## 2023-04-20 RX ADMIN — POTASSIUM CHLORIDE 40 MEQ: 1500 TABLET, EXTENDED RELEASE ORAL at 02:32

## 2023-04-20 NOTE — H&P
Patient Care Team:  Syed Garrido MD as PCP - General    Chief complaint chest pain, hypertension    Subjective     Patient is a 62 y.o. male who presents with complaints of hypertension for the past two days., running in the 170's systolic. He also has had intermittent substernal chest pressure over the past few days. Complains of mild headache. Denies any fever, chills, nausea, vomiting, visual changes, shortness of breath. Denies any chest pain at this time.     ER course with troponin negative x2, EKG sinus rhythm rate 72, Potassium 2.9. Blood pressure maintained 150-160's systolic.        Onset of symptoms was  2 days    Review of Systems   Constitutional: Negative.    Eyes: Negative.    Respiratory: Positive for chest tightness.    Cardiovascular: Positive for chest pain.   Gastrointestinal: Negative.    Endocrine: Negative.    Genitourinary: Negative.    Musculoskeletal: Negative.    Skin: Negative.    Neurological: Positive for headaches.   Psychiatric/Behavioral: Negative.           History  History reviewed. No pertinent past medical history.  No past surgical history on file.  History reviewed. No pertinent family history.     (Not in a hospital admission)    Allergies:  Patient has no known allergies.    Objective     Vital Signs  Temp:  [97.9 °F (36.6 °C)] 97.9 °F (36.6 °C)  Heart Rate:  [70-83] 71  Resp:  [16] 16  BP: (157-179)/(80-98) 159/80     Physical Exam:      General Appearance:    Alert, cooperative, in no acute distress   Head:    Normocephalic, without obvious abnormality, atraumatic   Eyes:            Lids and lashes normal, conjunctivae and sclerae normal, no   icterus, no pallor, corneas clear, PERRLA   Ears:    Ears appear intact with no abnormalities noted   Throat:   No oral lesions, no thrush, oral mucosa moist   Neck:   No adenopathy, supple, trachea midline, no thyromegaly, no   carotid bruit, no JVD   Lungs:     Clear to auscultation,respirations regular, even and                   unlabored    Heart:    Regular rhythm and normal rate, normal S1 and S2, no            murmur, no gallop, no rub, no click   Chest Wall:    No abnormalities observed   Abdomen:     Normal bowel sounds, no masses, no organomegaly, soft        non-tender, non-distended, no guarding, no rebound                tenderness   Extremities:   Moves all extremities well, no edema, no cyanosis, no             redness   Pulses:   Pulses palpable and equal bilaterally   Skin:   No bleeding, bruising or rash   Lymph nodes:   No palpable adenopathy   Neurologic:   No focal deficits noted       Results Review:     Imaging Results (Last 24 Hours)     Procedure Component Value Units Date/Time    XR Chest 1 View [[id number]] Collected: 04/19/23 2100     Updated: 04/19/23 2104    Narrative:      XR CHEST 1 VW    Date of Exam: 4/19/2023 8:20 PM EDT    Indication: Chest Pain Protocol  Chest Pain Protocol.    Comparison: None available.    Findings:  The heart is not definitely enlarged. The lungs seem clear. There are no pleural effusions.      Impression:      Impression:  1.An acute pulmonary process is not apparent.      Electronically Signed: Chucho James MD    4/19/2023 9:01 PM EDT    Workstation ID: [id number]           Lab Results (last 24 hours)     Procedure Component Value Units Date/Time    High Sensitivity Troponin T 2Hr [[id number]]  (Normal) Collected: 04/19/23 2158    Specimen: Blood Updated: 04/19/23 2223     HS Troponin T 9 ng/L      Troponin T Delta 1 ng/L     Narrative:      High Sensitive Troponin T Reference Range:  <10.0 ng/L- Negative Female for AMI  <15.0 ng/L- Negative Male for AMI  >=10 - Abnormal Female indicating possible myocardial injury.  >=15 - Abnormal Male indicating possible myocardial injury.   Clinicians would have to utilize clinical acumen, EKG, Troponin, and serial changes to determine if it is an Acute Myocardial Infarction or myocardial injury due to an underlying chronic condition.         T4,  Free [[id number]]  (Normal) Collected: 04/19/23 1935    Specimen: Blood Updated: 04/19/23 2200     Free T4 1.33 ng/dL     Narrative:      Results may be falsely increased if patient taking Biotin.      Lipid Panel [[id number]]  (Abnormal) Collected: 04/19/23 1935    Specimen: Blood Updated: 04/19/23 2154     Total Cholesterol 199 mg/dL      Triglycerides 88 mg/dL      HDL Cholesterol 70 mg/dL      LDL Cholesterol  113 mg/dL      VLDL Cholesterol 16 mg/dL      LDL/HDL Ratio 1.59    Narrative:      Cholesterol Reference Ranges  (U.[place] Department of Health and Human Services ATP III Classifications)    Desirable          <200 mg/dL  Borderline High    200-239 mg/dL  High Risk          >240 mg/dL      Triglyceride Reference Ranges  (U.S. Department of Health and Human Services ATP III Classifications)    Normal           <150 mg/dL  Borderline High  150-199 mg/dL  High             200-499 mg/dL  Very High        >500 mg/dL    HDL Reference Ranges  (U.S. Department of Health and Human Services ATP III Classifications)    Low     <40 mg/dl (major risk factor for CHD)  High    >60 mg/dl ('negative' risk factor for CHD)        LDL Reference Ranges  (U.S. Department of Health and Human Services ATP III Classifications)    Optimal          <100 mg/dL  Near Optimal     100-129 mg/dL  Borderline High  130-159 mg/dL  High             160-189 mg/dL  Very High        >189 mg/dL    TSH [[id number]]  (Normal) Collected: 04/19/23 1935    Specimen: Blood Updated: 04/19/23 2108     TSH 2.590 uIU/mL     Magnesium [[id number]]  (Normal) Collected: 04/19/23 1935    Specimen: Blood Updated: 04/19/23 2036     Magnesium 2.0 mg/dL     Comprehensive Metabolic Panel [[id number]]  (Abnormal) Collected: 04/19/23 1935    Specimen: Blood Updated: 04/19/23 2009     Glucose 107 mg/dL      BUN 15 mg/dL      Creatinine 0.83 mg/dL      Sodium 141 mmol/L      Potassium 2.9 mmol/L      Chloride 100 mmol/L      CO2 29.0 mmol/L      Calcium 9.8 mg/dL      Total  Protein 7.2 g/dL      Albumin 4.4 g/dL      ALT (SGPT) 15 U/L      AST (SGOT) 17 U/L      Alkaline Phosphatase 50 U/L      Total Bilirubin 0.6 mg/dL      Globulin 2.8 gm/dL      A/G Ratio 1.6 g/dL      BUN/Creatinine Ratio 18.1     Anion Gap 12.0 mmol/L      eGFR 98.3 mL/min/1.73     Narrative:      GFR Normal >60  Chronic Kidney Disease <60  Kidney Failure <15      High Sensitivity Troponin T [[id number]]  (Normal) Collected: 04/19/23 1935    Specimen: Blood Updated: 04/19/23 2009     HS Troponin T 8 ng/L     Narrative:      High Sensitive Troponin T Reference Range:  <10.0 ng/L- Negative Female for AMI  <15.0 ng/L- Negative Male for AMI  >=10 - Abnormal Female indicating possible myocardial injury.  >=15 - Abnormal Male indicating possible myocardial injury.   Clinicians would have to utilize clinical acumen, EKG, Troponin, and serial changes to determine if it is an Acute Myocardial Infarction or myocardial injury due to an underlying chronic condition.         CBC & Differential [[id number]]  (Normal) Collected: 04/19/23 1935    Specimen: Blood Updated: 04/19/23 1951    Narrative:      The following orders were created for panel order CBC & Differential.  Procedure                               Abnormality         Status                     ---------                               -----------         ------                     CBC Auto Differential[[id number]]        Normal              Final result                 Please view results for these tests on the individual orders.    CBC Auto Differential [[id number]]  (Normal) Collected: 04/19/23 1935    Specimen: Blood Updated: 04/19/23 1951     WBC 7.50 10*3/mm3      RBC 4.43 10*6/mm3      Hemoglobin 13.3 g/dL      Hematocrit 39.6 %      MCV 89.5 fL      MCH 30.0 pg      MCHC 33.5 g/dL      RDW 13.4 %      RDW-SD 41.1 fl      MPV 7.5 fL      Platelets 224 10*3/mm3      Neutrophil % 60.8 %      Lymphocyte % 26.6 %      Monocyte % 9.9 %      Eosinophil % 2.3 %       Basophil % 0.4 %      Neutrophils, Absolute 4.60 10*3/mm3      Lymphocytes, Absolute 2.00 10*3/mm3      Monocytes, Absolute 0.70 10*3/mm3      Eosinophils, Absolute 0.20 10*3/mm3      Basophils, Absolute 0.00 10*3/mm3      nRBC 0.0 /100 WBC            I reviewed the patient's new clinical results.    Assessment & Plan       Chest pain  -troponin negative x2  -EKG sinus rhythm  -CXR with no acute findings  -Echo pending  -Keep npo for stress test in AM    Hypokalemia  -K 2.9, replaced      DVT prophylaxis- SCD's  GI prophylaxis- pepcid    I discussed the patient's findings and my recommendations with patient.     Eli Steele, APRN  04/19/23  22:55 EDT

## 2023-04-20 NOTE — PLAN OF CARE
Goal Outcome Evaluation:      Pt was admitted to the unit. Pt was oriented to the the unit. Education provided on the use of call light, and other safety measures. V/S checked as documented. Safety measures in place. Call light within reach. Will continue to monitor.

## 2023-04-20 NOTE — PLAN OF CARE
Problem: Adult Inpatient Plan of Care  Goal: Absence of Hospital-Acquired Illness or Injury  Intervention: Identify and Manage Fall Risk  Recent Flowsheet Documentation  Taken 4/20/2023 0153 by Cruz Wood RN  Safety Promotion/Fall Prevention:   assistive device/personal items within reach   clutter free environment maintained   fall prevention program maintained   nonskid shoes/slippers when out of bed   room organization consistent   safety round/check completed  Intervention: Prevent Skin Injury  Recent Flowsheet Documentation  Taken 4/20/2023 0153 by Cruz Wood RN  Body Position: position changed independently  Skin Protection: adhesive use limited  Intervention: Prevent and Manage VTE (Venous Thromboembolism) Risk  Recent Flowsheet Documentation  Taken 4/20/2023 0153 by Cruz Wood RN  VTE Prevention/Management:   sequential compression devices off   patient refused intervention  Range of Motion:   active ROM (range of motion) encouraged   ROM (range of motion) performed  Intervention: Prevent Infection  Recent Flowsheet Documentation  Taken 4/20/2023 0153 by Cruz Wood RN  Infection Prevention:   single patient room provided   rest/sleep promoted   personal protective equipment utilized   hand hygiene promoted   equipment surfaces disinfected   environmental surveillance performed  Goal: Optimal Comfort and Wellbeing  Intervention: Monitor Pain and Promote Comfort  Recent Flowsheet Documentation  Taken 4/20/2023 0153 by Cruz Wood RN  Pain Management Interventions: (pt declined anything for headache right now)   see MAR   quiet environment facilitated  Intervention: Provide Person-Centered Care  Recent Flowsheet Documentation  Taken 4/20/2023 0153 by Cruz Wood RN  Trust Relationship/Rapport: care explained  Goal: Readiness for Transition of Care  Intervention: Mutually Develop Transition Plan  Recent Flowsheet Documentation  Taken 4/20/2023 0143 by Cruz Wood  RN  Equipment Currently Used at Home: none  Taken 4/20/2023 0141 by Cruz Wood RN  Transportation Anticipated: family or friend will provide  Patient/Family Anticipated Services at Transition: none  Patient/Family Anticipates Transition to: home with family  Taken 4/20/2023 0140 by Cruz Wood, RN  Transportation Anticipated: family or friend will provide  Patient/Family Anticipated Services at Transition: none  Patient/Family Anticipates Transition to: home with family  Taken 4/20/2023 0128 by Cruz Wood, RN  Equipment Currently Used at Home: none   Goal Outcome Evaluation:

## 2023-04-20 NOTE — CASE MANAGEMENT/SOCIAL WORK
Continued Stay Note  MARCY Chong     Patient Name: Ivan Smith  MRN: [id number]  Today's Date: 4/20/2023    Admit Date: 4/19/2023    Plan: Needs screened   Discharge Plan     Row Name 04/20/23 1227       Plan    Plan Needs screened    Plan Comments Pt off floor for testing during CM rounds            Phone communication or documentation only - no physical contact with patient or family.             Rodriguez Funk RN

## 2023-04-20 NOTE — DISCHARGE SUMMARY
Date of Discharge:  4/20/2023    Discharge Diagnosis:   **Chest pain [R07.9]   Headache [R51.9]   Hypokalemia [E87.6]   Hypertension [I10]       Presenting Problem/History of Present Illness  Active Hospital Problems    Diagnosis  POA   • **Chest pain [R07.9]  Yes   • Headache [R51.9]  Yes   • Hypokalemia [E87.6]  Yes   • Hypertension [I10]  Yes      Resolved Hospital Problems   No resolved problems to display.          Hospital Course  Patient is a 62 y.o. male with history of hypertension who presented with episode of intermittent substernal chest pressure and elevated blood pressure readings.  His normal systolic blood pressure is in the 150s.  He noticed that his blood pressure was running in the 170s at home.  He did not feel well and had some vague substernal chest pressure.  He had no diaphoresis, nausea, dyspnea.  He has been compliant with his home blood pressure regimen.  There was no prior history of coronary artery disease.  He ruled out for acute coronary syndrome with unremarkable EKG and normal serial troponins.  His potassium was slightly low on arrival but was replaced.  He had no recurrence of his symptoms.  Blood pressure was well controlled with home medications.  Stress test was normal.  The time of discharge she is hemodynamically stable and has a normal exam.  I have added famotidine for possible GERD symptoms causing chest pain.  He will also be scheduled for an outpatient renal Doppler to rule out renal artery stenosis as a cause of hypertension that is requiring multiple medications to control.  He has outpatient follow-up scheduled with Dr. Garrido.    Procedures Performed         Consults:   Consults     Date and Time Order Name Status Description    4/19/2023  8:22 PM Family Medicine Consult            Pertinent Test Results:    Lab Results (most recent)     Procedure Component Value Units Date/Time    Potassium [[id number]]  (Normal) Collected: 04/20/23 1554    Specimen: Blood Updated:  04/20/23 1640     Potassium 3.5 mmol/L     High Sensitivity Troponin T 2Hr [[id number]]  (Normal) Collected: 04/19/23 2158    Specimen: Blood Updated: 04/19/23 2223     HS Troponin T 9 ng/L      Troponin T Delta 1 ng/L     Narrative:      High Sensitive Troponin T Reference Range:  <10.0 ng/L- Negative Female for AMI  <15.0 ng/L- Negative Male for AMI  >=10 - Abnormal Female indicating possible myocardial injury.  >=15 - Abnormal Male indicating possible myocardial injury.   Clinicians would have to utilize clinical acumen, EKG, Troponin, and serial changes to determine if it is an Acute Myocardial Infarction or myocardial injury due to an underlying chronic condition.         T4, Free [[id number]]  (Normal) Collected: 04/19/23 1935    Specimen: Blood Updated: 04/19/23 2200     Free T4 1.33 ng/dL     Narrative:      Results may be falsely increased if patient taking Biotin.      Lipid Panel [[id number]]  (Abnormal) Collected: 04/19/23 1935    Specimen: Blood Updated: 04/19/23 2154     Total Cholesterol 199 mg/dL      Triglycerides 88 mg/dL      HDL Cholesterol 70 mg/dL      LDL Cholesterol  113 mg/dL      VLDL Cholesterol 16 mg/dL      LDL/HDL Ratio 1.59    Narrative:      Cholesterol Reference Ranges  ([place] Department of Health and Human Services ATP III Classifications)    Desirable          <200 mg/dL  Borderline High    200-239 mg/dL  High Risk          >240 mg/dL      Triglyceride Reference Ranges  ([place].S. Department of Health and Human Services ATP III Classifications)    Normal           <150 mg/dL  Borderline High  150-199 mg/dL  High             200-499 mg/dL  Very High        >500 mg/dL    HDL Reference Ranges  ([place].S. Department of Health and Human Services ATP III Classifications)    Low     <40 mg/dl (major risk factor for CHD)  High    >60 mg/dl ('negative' risk factor for CHD)        LDL Reference Ranges  (U.S. Department of Health and Human Services ATP III Classifications)    Optimal          <100  mg/dL  Near Optimal     100-129 mg/dL  Borderline High  130-159 mg/dL  High             160-189 mg/dL  Very High        >189 mg/dL    TSH [[id number]]  (Normal) Collected: 04/19/23 1935    Specimen: Blood Updated: 04/19/23 2108     TSH 2.590 uIU/mL     Magnesium [[id number]]  (Normal) Collected: 04/19/23 1935    Specimen: Blood Updated: 04/19/23 2036     Magnesium 2.0 mg/dL     Comprehensive Metabolic Panel [[id number]]  (Abnormal) Collected: 04/19/23 1935    Specimen: Blood Updated: 04/19/23 2009     Glucose 107 mg/dL      BUN 15 mg/dL      Creatinine 0.83 mg/dL      Sodium 141 mmol/L      Potassium 2.9 mmol/L      Chloride 100 mmol/L      CO2 29.0 mmol/L      Calcium 9.8 mg/dL      Total Protein 7.2 g/dL      Albumin 4.4 g/dL      ALT (SGPT) 15 U/L      AST (SGOT) 17 U/L      Alkaline Phosphatase 50 U/L      Total Bilirubin 0.6 mg/dL      Globulin 2.8 gm/dL      A/G Ratio 1.6 g/dL      BUN/Creatinine Ratio 18.1     Anion Gap 12.0 mmol/L      eGFR 98.3 mL/min/1.73     Narrative:      GFR Normal >60  Chronic Kidney Disease <60  Kidney Failure <15      High Sensitivity Troponin T [[id number]]  (Normal) Collected: 04/19/23 1935    Specimen: Blood Updated: 04/19/23 2009     HS Troponin T 8 ng/L     Narrative:      High Sensitive Troponin T Reference Range:  <10.0 ng/L- Negative Female for AMI  <15.0 ng/L- Negative Male for AMI  >=10 - Abnormal Female indicating possible myocardial injury.  >=15 - Abnormal Male indicating possible myocardial injury.   Clinicians would have to utilize clinical acumen, EKG, Troponin, and serial changes to determine if it is an Acute Myocardial Infarction or myocardial injury due to an underlying chronic condition.         CBC & Differential [[id number]]  (Normal) Collected: 04/19/23 1935    Specimen: Blood Updated: 04/19/23 1951    Narrative:      The following orders were created for panel order CBC & Differential.  Procedure                               Abnormality         Status                      ---------                               -----------         ------                     CBC Auto Differential[[id number]]        Normal              Final result                 Please view results for these tests on the individual orders.    CBC Auto Differential [[id number]]  (Normal) Collected: 04/19/23 1935    Specimen: Blood Updated: 04/19/23 1951     WBC 7.50 10*3/mm3      RBC 4.43 10*6/mm3      Hemoglobin 13.3 g/dL      Hematocrit 39.6 %      MCV 89.5 fL      MCH 30.0 pg      MCHC 33.5 g/dL      RDW 13.4 %      RDW-SD 41.1 fl      MPV 7.5 fL      Platelets 224 10*3/mm3      Neutrophil % 60.8 %      Lymphocyte % 26.6 %      Monocyte % 9.9 %      Eosinophil % 2.3 %      Basophil % 0.4 %      Neutrophils, Absolute 4.60 10*3/mm3      Lymphocytes, Absolute 2.00 10*3/mm3      Monocytes, Absolute 0.70 10*3/mm3      Eosinophils, Absolute 0.20 10*3/mm3      Basophils, Absolute 0.00 10*3/mm3      nRBC 0.0 /100 WBC                        Condition on Discharge:  Stable    Vital Signs  Temp:  [97.7 °F (36.5 °C)-97.9 °F (36.6 °C)] 97.9 °F (36.6 °C)  Heart Rate:  [62-83] 72  Resp:  [13-18] 16  BP: (126-179)/(72-98) 148/83    Physical Exam:     General Appearance:    Alert, cooperative, in no acute distress   Head:    Normocephalic, without obvious abnormality, atraumatic   Eyes:            Lids and lashes normal, conjunctivae and sclerae normal, no   icterus, no pallor, corneas clear, PERRLA   Ears:    Ears appear intact with no abnormalities noted   Throat:   No oral lesions, no thrush, oral mucosa moist   Neck:   No adenopathy, supple, trachea midline, no thyromegaly, no   carotid bruit, no JVD   Lungs:     Clear to auscultation,respirations regular, even and                  unlabored    Heart:    Regular rhythm and normal rate, normal S1 and S2, no            murmur, no gallop, no rub, no click   Chest Wall:    No abnormalities observed   Abdomen:     Normal bowel sounds, no masses, no organomegaly, soft         non-tender, non-distended, no guarding, no rebound                tenderness   Extremities:   Moves all extremities well, no edema, no cyanosis, no             redness   Pulses:   Pulses palpable and equal bilaterally   Skin:   No bleeding, bruising or rash   Lymph nodes:   No palpable adenopathy   Neurologic:   Cranial nerves 2 - 12 grossly intact, sensation intact, DTR       present and equal bilaterally       Discharge Disposition  Home or Self Care    Discharge Medications     Discharge Medications      New Medications      Instructions Start Date   acetaminophen 325 MG tablet  Commonly known as: TYLENOL   650 mg, Oral, Every 4 Hours PRN      famotidine 40 MG tablet  Commonly known as: PEPCID   40 mg, Oral, Nightly         Continue These Medications      Instructions Start Date   amLODIPine 5 MG tablet  Commonly known as: NORVASC   No dose, route, or frequency recorded.      hydrALAZINE 50 MG tablet  Commonly known as: APRESOLINE   HYDRALAZINE HCL 50 MG TABS      hydroCHLOROthiazide 25 MG tablet  Commonly known as: HYDRODIURIL   HYDROCHLOROTHIAZIDE 25 MG TABS      lisinopril 20 MG tablet  Commonly known as: PRINIVIL,ZESTRIL   TAKE 1 TABLET BY MOUTH TWO TIMES A DAY       nebivolol 20 MG tablet  Commonly known as: BYSTOLIC   BYSTOLIC 20 MG TABS      tamsulosin 0.4 MG capsule 24 hr capsule  Commonly known as: FLOMAX   FLOMAX 0.4 MG CAPS             Discharge Diet: Healthy heart    Activity at Discharge: No restrictions    Follow-up Appointments  No future appointments.  Additional Instructions for the Follow-ups that You Need to Schedule     Discharge Follow-up with PCP   As directed       Currently Documented PCP:    Syed Garrido MD    PCP Phone Number:    [phone]     Follow Up Details: Keep appointment on 5/23 at 8:20 with Dr. Garrido.   Call [facility] if you are having problems with blood pressure or other issues before appointment.               Test Results Pending at Discharge       Chantal Fierro  MD  04/20/23  16:49 EDT    Time: Discharge 25 min

## 2023-04-20 NOTE — PLAN OF CARE
Problem: Adult Inpatient Plan of Care  Goal: Plan of Care Review  Outcome: Met  Goal: Patient-Specific Goal (Individualized)  Outcome: Met  Goal: Absence of Hospital-Acquired Illness or Injury  Outcome: Met  Intervention: Identify and Manage Fall Risk  Recent Flowsheet Documentation  Taken 4/20/2023 1600 by Saige Mccann RN  Safety Promotion/Fall Prevention: safety round/check completed  Taken 4/20/2023 1400 by Saige Mccann RN  Safety Promotion/Fall Prevention: safety round/check completed  Taken 4/20/2023 1250 by Saige Mccann RN  Safety Promotion/Fall Prevention: patient off unit  Taken 4/20/2023 1019 by Saige Mccann RN  Safety Promotion/Fall Prevention: safety round/check completed  Taken 4/20/2023 0812 by Saige Mccann RN  Safety Promotion/Fall Prevention: safety round/check completed  Intervention: Prevent and Manage VTE (Venous Thromboembolism) Risk  Recent Flowsheet Documentation  Taken 4/20/2023 0812 by Saige Mccann RN  Range of Motion: active ROM (range of motion) encouraged  Goal: Optimal Comfort and Wellbeing  Outcome: Met  Intervention: Provide Person-Centered Care  Recent Flowsheet Documentation  Taken 4/20/2023 0812 by Saige Mccann RN  Trust Relationship/Rapport:   care explained   choices provided   emotional support provided  Goal: Readiness for Transition of Care  Outcome: Met     Problem: Chest Pain  Goal: Resolution of Chest Pain Symptoms  Outcome: Met   Goal Outcome Evaluation:      Pt wife to drive home, pt no complaints at this time. Discharge information and medication information provided. IV removed intact.

## 2023-04-21 NOTE — CASE MANAGEMENT/SOCIAL WORK
Case Management Discharge Note      Final Note: Home     Discharged prior to CM assessment.     Selected Continued Care - Discharged on 4/20/2023 Admission date: 4/19/2023 - Discharge disposition: Home or Self Care            Transportation Services  Private: Car    Final Discharge Disposition Code: 01 - home or self-care

## 2023-04-28 ENCOUNTER — HOSPITAL ENCOUNTER (OUTPATIENT)
Dept: CARDIOLOGY | Facility: HOSPITAL | Age: 63
Discharge: HOME | End: 2023-04-28
Payer: COMMERCIAL

## 2023-04-28 DIAGNOSIS — I15.0 RENOVASCULAR HYPERTENSION: ICD-10-CM

## 2023-04-28 LAB
BH CV ECHO MEAS - DIST REN A EDV LEFT: 43 CM/S
BH CV ECHO MEAS - DIST REN A PSV LEFT: 112 CM/S
BH CV ECHO MEAS - MID REN A EDV LEFT: 32.2 CM/S
BH CV ECHO MEAS - MID REN A PSV LEFT: 82.5 CM/S
BH CV ECHO MEAS - PROX REN A EDV LEFT: 26.5 CM/S
BH CV ECHO MEAS - PROX REN A PSV LEFT: 72.6 CM/S
BH CV VAS RENAL AORTIC MID PSV: 125 CM/S
BH CV VAS RENAL HILUM LEFT EDV: 11 CM/S
BH CV VAS RENAL HILUM LEFT PSV: 28 CM/S
BH CV VAS RENAL HILUM RIGHT EDV: 10 CM/S
BH CV VAS RENAL HILUM RIGHT PSV: 34 CM/S
BH CV XLRA MEAS - KID L LEFT: 11.9 CM
BH CV XLRA MEAS DIST REN A EDV RIGHT: 12.9 CM/S
BH CV XLRA MEAS DIST REN A PSV RIGHT: 36.5 CM/S
BH CV XLRA MEAS KID L RIGHT: 13 CM
BH CV XLRA MEAS MID REN A EDV RIGHT: 11.6 CM/S
BH CV XLRA MEAS MID REN A PSV RIGHT: 36 CM/S
BH CV XLRA MEAS PROX REN A EDV RIGHT: 23.7 CM/S
BH CV XLRA MEAS PROX REN A PSV RIGHT: 85.7 CM/S
BH CV XLRA MEAS RAR LEFT: 0.9
BH CV XLRA MEAS RAR RIGHT: 0.69
LEFT RENAL UPPER PARENCHYMA MAX: 17 CM/S
LEFT RENAL UPPER PARENCHYMA MIN: 8 CM/S
LEFT RENAL UPPER PARENCHYMA RI: 0.53
MAXIMAL PREDICTED HEART RATE: 157 BPM
RIGHT RENAL UPPER PARENCHYMA MAX: 17 CM/S
RIGHT RENAL UPPER PARENCHYMA MIN: 5 CM/S
RIGHT RENAL UPPER PARENCHYMA RI: 0.71
STRESS TARGET HR: 133 BPM

## 2023-04-28 PROCEDURE — 93975 VASCULAR STUDY: CPT

## 2023-12-08 PROBLEM — Z12.11 SCREENING FOR MALIGNANT NEOPLASMS OF COLON: Status: ACTIVE | Noted: 2019-02-11

## 2024-02-15 ENCOUNTER — ON CAMPUS - OUTPATIENT (AMBULATORY)
Dept: URBAN - METROPOLITAN AREA HOSPITAL 2 | Facility: HOSPITAL | Age: 64
End: 2024-02-15
Payer: COMMERCIAL

## 2024-02-15 VITALS
SYSTOLIC BLOOD PRESSURE: 139 MMHG | WEIGHT: 184 LBS | DIASTOLIC BLOOD PRESSURE: 72 MMHG | SYSTOLIC BLOOD PRESSURE: 108 MMHG | DIASTOLIC BLOOD PRESSURE: 79 MMHG | HEART RATE: 61 BPM | DIASTOLIC BLOOD PRESSURE: 62 MMHG | HEART RATE: 53 BPM | DIASTOLIC BLOOD PRESSURE: 73 MMHG | HEART RATE: 55 BPM | OXYGEN SATURATION: 100 % | DIASTOLIC BLOOD PRESSURE: 63 MMHG | SYSTOLIC BLOOD PRESSURE: 95 MMHG | SYSTOLIC BLOOD PRESSURE: 103 MMHG | DIASTOLIC BLOOD PRESSURE: 67 MMHG | DIASTOLIC BLOOD PRESSURE: 61 MMHG | SYSTOLIC BLOOD PRESSURE: 92 MMHG | HEART RATE: 64 BPM | RESPIRATION RATE: 16 BRPM | RESPIRATION RATE: 18 BRPM | DIASTOLIC BLOOD PRESSURE: 59 MMHG | HEART RATE: 63 BPM | RESPIRATION RATE: 20 BRPM | SYSTOLIC BLOOD PRESSURE: 101 MMHG | RESPIRATION RATE: 17 BRPM | SYSTOLIC BLOOD PRESSURE: 106 MMHG | HEIGHT: 73 IN | HEART RATE: 51 BPM | OXYGEN SATURATION: 99 % | TEMPERATURE: 98.2 F | RESPIRATION RATE: 14 BRPM | SYSTOLIC BLOOD PRESSURE: 146 MMHG | SYSTOLIC BLOOD PRESSURE: 114 MMHG

## 2024-02-15 DIAGNOSIS — K57.30 DIVERTICULOSIS OF LARGE INTESTINE WITHOUT PERFORATION OR ABS: ICD-10-CM

## 2024-02-15 DIAGNOSIS — Z80.0 FAMILY HISTORY OF MALIGNANT NEOPLASM OF DIGESTIVE ORGANS: ICD-10-CM

## 2024-02-15 DIAGNOSIS — Z12.11 ENCOUNTER FOR SCREENING FOR MALIGNANT NEOPLASM OF COLON: ICD-10-CM

## 2024-02-15 PROCEDURE — G0105 COLORECTAL SCRN; HI RISK IND: HCPCS | Performed by: INTERNAL MEDICINE
